# Patient Record
Sex: MALE | Race: WHITE | NOT HISPANIC OR LATINO | Employment: OTHER | URBAN - NONMETROPOLITAN AREA
[De-identification: names, ages, dates, MRNs, and addresses within clinical notes are randomized per-mention and may not be internally consistent; named-entity substitution may affect disease eponyms.]

---

## 2019-01-18 ENCOUNTER — HOSPITAL ENCOUNTER (EMERGENCY)
Facility: HOSPITAL | Age: 69
Discharge: HOME | End: 2019-01-18
Attending: FAMILY MEDICINE | Admitting: FAMILY MEDICINE

## 2019-01-18 VITALS
OXYGEN SATURATION: 96 % | TEMPERATURE: 97.1 F | HEART RATE: 62 BPM | DIASTOLIC BLOOD PRESSURE: 75 MMHG | BODY MASS INDEX: 34.52 KG/M2 | WEIGHT: 277.6 LBS | HEIGHT: 75 IN | RESPIRATION RATE: 20 BRPM | SYSTOLIC BLOOD PRESSURE: 145 MMHG

## 2019-01-18 DIAGNOSIS — F32.A ANXIETY AND DEPRESSION: Primary | ICD-10-CM

## 2019-01-18 DIAGNOSIS — F41.9 ANXIETY AND DEPRESSION: Primary | ICD-10-CM

## 2019-01-18 PROCEDURE — 99282 EMERGENCY DEPT VISIT SF MDM: CPT

## 2019-01-18 RX ORDER — FLUOXETINE HYDROCHLORIDE 20 MG/1
40 CAPSULE ORAL 2 TIMES DAILY
Qty: 28 CAPSULE | Refills: 0 | Status: SHIPPED | OUTPATIENT
Start: 2019-01-18 | End: 2019-01-25

## 2019-01-19 NOTE — ED PROVIDER NOTES
"Subjective   Patient presents to emergency department for medication refill.  States he is out of his fluoxetine and cannot get a refill until next week.  States he just needs some to hold him over until [facility].  Denies SI/HI, hallucinations.  Denies any physical symptoms.          History provided by:  Patient   used: No        Review of Systems   Constitutional: Negative for chills and fever.   Eyes: Negative for visual disturbance.   Respiratory: Negative for shortness of breath.    Cardiovascular: Negative for chest pain.   Gastrointestinal: Negative for abdominal pain.   Genitourinary: Negative for dysuria.   Neurological: Negative for headaches.   Psychiatric/Behavioral: Negative for agitation, confusion, dysphoric mood, hallucinations, self-injury and suicidal ideas. The patient is not nervous/anxious.        No past medical history on file.    Allergies   Allergen Reactions   • Amoxapine Other (See Comments)     \"I become manic.\"   • Loxapine Other (See Comments)     \"I become psychotic when I take it\"   • Niaspan [Niacin] Itching   • Prolixin [Fluphenazine] Other (See Comments)     \"I don't feel good when I take it\"   • Triavil [Perphenazine-Amitriptyline] Other (See Comments)     \"Makes me manic\"       No past surgical history on file.    No family history on file.    Social History     Socioeconomic History   • Marital status:      Spouse name: Not on file   • Number of children: Not on file   • Years of education: Not on file   • Highest education level: Not on file           Objective      /75 (BP Location: Left arm, Patient Position: Sitting)   Pulse 62   Temp 97.1 °F (36.2 °C) (Temporal)   Resp 20   Ht 190.5 cm (75\")   Wt 126 kg (277 lb 9.6 oz)   SpO2 96%   BMI 34.70 kg/m²     Physical Exam   Constitutional: He is oriented to person, place, and time. He appears well-developed and well-nourished.   HENT:   Head: Normocephalic and " atraumatic.   Eyes: Conjunctivae are normal.   Cardiovascular: Normal rate, regular rhythm, normal heart sounds and intact distal pulses.   Pulmonary/Chest: Effort normal and breath sounds normal. No respiratory distress. He has no wheezes.   Neurological: He is alert and oriented to person, place, and time.   Skin: Skin is warm. Capillary refill takes less than 2 seconds.   Psychiatric: He has a normal mood and affect. His behavior is normal. Thought content normal.   Nursing note and vitals reviewed.      Procedures           ED Course                  MDM      Final diagnoses:   Anxiety and depression            Vincent Au PA-C  01/18/19 0934

## 2020-11-11 ENCOUNTER — TRANSCRIBE ORDERS (OUTPATIENT)
Dept: ORTHOPEDIC SURGERY | Facility: CLINIC | Age: 70
End: 2020-11-11

## 2020-11-11 DIAGNOSIS — S62.626A DISPLACED FRACTURE OF MIDDLE PHALANX OF RIGHT LITTLE FINGER, INITIAL ENCOUNTER FOR CLOSED FRACTURE: Primary | ICD-10-CM

## 2020-11-13 ENCOUNTER — OFFICE VISIT (OUTPATIENT)
Dept: ORTHOPEDIC SURGERY | Facility: CLINIC | Age: 70
End: 2020-11-13

## 2020-11-13 VITALS — WEIGHT: 264 LBS | BODY MASS INDEX: 32.83 KG/M2 | HEIGHT: 75 IN

## 2020-11-13 DIAGNOSIS — S66.901A INJURY OF EXTENSOR TENDON OF RIGHT HAND, INITIAL ENCOUNTER: ICD-10-CM

## 2020-11-13 DIAGNOSIS — M79.641 RIGHT HAND PAIN: Primary | ICD-10-CM

## 2020-11-13 DIAGNOSIS — S62.629A CLOSED AVULSION FRACTURE OF MIDDLE PHALANX OF FINGER, INITIAL ENCOUNTER: ICD-10-CM

## 2020-11-13 PROCEDURE — 26720 TREAT FINGER FRACTURE EACH: CPT | Performed by: NURSE PRACTITIONER

## 2020-11-13 PROCEDURE — 99203 OFFICE O/P NEW LOW 30 MIN: CPT | Performed by: NURSE PRACTITIONER

## 2020-11-13 RX ORDER — INFLUENZA A VIRUS A/MICHIGAN/45/2015 X-275 (H1N1) ANTIGEN (FORMALDEHYDE INACTIVATED), INFLUENZA A VIRUS A/SINGAPORE/INFIMH-16-0019/2016 IVR-186 (H3N2) ANTIGEN (FORMALDEHYDE INACTIVATED), INFLUENZA B VIRUS B/PHUKET/3073/2013 ANTIGEN (FORMALDEHYDE INACTIVATED), AND INFLUENZA B VIRUS B/MARYLAND/15/2016 BX-69A ANTIGEN (FORMALDEHYDE INACTIVATED) 60; 60; 60; 60 UG/.7ML; UG/.7ML; UG/.7ML; UG/.7ML
INJECTION, SUSPENSION INTRAMUSCULAR
COMMUNITY
Start: 2020-10-06

## 2020-11-13 NOTE — PROGRESS NOTES
"Mustapha Cassidy is a 70 y.o. male   Primary provider:  Vishal Brown MD       Chief Complaint   Patient presents with   • Right Hand - Pain, Initial Evaluation       HISTORY OF PRESENT ILLNESS: Patient is a 70-year-old male who presents today with complaints of avulsion fracture at the base of the middle phalanx of the right ring finger.  Patient reports that injury occurred on 10/30/2020 after he tripped and fell onto a cart table.  He reports that his pain is under control, he rates his pain as a 1 out of 10.  [facility]  He denies burning, tingling, numbness.  He reports occasional pain with movement.  He is wearing a del splint that was placed on him at the urgent care center.  He reports a small deformity of ring finger, states that he would like finger straightened out if possible.      Pain  This is a new problem. Episode onset: 10/30/2020. The problem occurs intermittently. Associated symptoms comments: Aching . Exacerbated by: driving  He has tried rest for the symptoms.        CONCURRENT MEDICAL HISTORY:    Past Medical History:   Diagnosis Date   • Diabetes (CMS/[facility], located [facility])    • Kidney disease    • Sleep apnea        Allergies   Allergen Reactions   • Amoxapine Other (See Comments)     \"I become manic.\"   • Loxapine Other (See Comments)     \"I become psychotic when I take it\"   • Niaspan [Niacin] Itching   • Prolixin [Fluphenazine] Other (See Comments)     \"I don't feel good when I take it\"   • Triavil [Perphenazine-Amitriptyline] Other (See Comments)     \"Makes me manic\"         Current Outpatient Medications:   •  Fluzone High-Dose Quadrivalent 0.7 ML suspension prefilled syringe, PHARMACY ADMINISTERED, Disp: , Rfl:     No past surgical history on file.    History reviewed. No pertinent family history.     Social History     Socioeconomic History   • Marital status:      Spouse name: Not on file   • Number of children: Not on file   • Years of " "education: Not on file   • Highest education level: Not on file   Tobacco Use   • Smoking status: Never Smoker   Substance and Sexual Activity   • Alcohol use: Never     Frequency: Never   • Drug use: Never   • Sexual activity: Defer        Review of Systems   Constitutional: Negative.    HENT: Negative.    Eyes: Negative.    Respiratory: Negative.    Cardiovascular: Negative.    Gastrointestinal: Negative.    Endocrine: Negative.    Genitourinary: Negative.    Musculoskeletal:        Right ring finger pain   Skin: Negative.    Allergic/Immunologic: Negative.    Neurological: Negative.    Hematological: Negative.    Psychiatric/Behavioral: Negative.        PHYSICAL EXAMINATION:       Ht 190.5 cm (75\")   Wt 120 kg (264 lb)   BMI 33.00 kg/m²     Physical Exam  Vitals signs and nursing note reviewed.   Constitutional:       General: He is not in acute distress.     Appearance: He is well-developed. He is not toxic-appearing.   HENT:      Head: Normocephalic.   Pulmonary:      Effort: Pulmonary effort is normal. No respiratory distress.   Skin:     General: Skin is warm and dry.   Neurological:      Mental Status: He is alert and oriented to person, place, and time.   Psychiatric:         Behavior: Behavior normal.         Thought Content: Thought content normal.         Judgment: Judgment normal.         GAIT:     []  Normal  []  Antalgic    Assistive device: [x]  None  []  Walker     []  Crutches  []  Cane     []  Wheelchair  []  Stretcher    Right Hand Exam     Tenderness   The patient is experiencing no tenderness.     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 boutonniere " deformity  Mild swelling                    ASSESSMENT:    Diagnoses and all orders for this visit:    Right hand pain    Closed avulsion fracture of middle phalanx of finger, initial encounter  -     Ambulatory Referral to Physical Therapy Evaluate and treat; (as indicated ); ROM    Injury of extensor tendon of right hand, initial encounter  -     Ambulatory Referral to Physical Therapy Evaluate and treat; (as indicated ); ROM    Other orders  -     Fluzone High-Dose Quadrivalent 0.7 ML suspension prefilled syringe; PHARMACY ADMINISTERED          PLAN    X-rays reviewed there is a small avulsion fracture off the base of the middle phalanx of the fourth digit.  Patient is not having any bony tenderness but does have ligament tenderness when flexing and extending finger.  Patient placed back in del splint at this time.  Patient instructed to come out of splint and perform gentle range of motion periodically to prevent stiffness.  Patient sent to hand physical therapy for custom orthotic to help with extension and correction of subtle boutonniere deformity as well as range of motion and strengthening.  Patient to return in 4 weeks for recheck.    Body mass index is 33 kg/m².    Return in about 4 weeks (around 12/11/2020).    Krissy Sal, APRN

## 2020-11-15 PROBLEM — S62.629A CLOSED AVULSION FRACTURE OF MIDDLE PHALANX OF FINGER: Status: ACTIVE | Noted: 2020-11-15

## 2020-11-15 PROBLEM — S66.901A INJURY OF EXTENSOR TENDON OF RIGHT HAND: Status: ACTIVE | Noted: 2020-11-15
